# Patient Record
Sex: FEMALE | Race: BLACK OR AFRICAN AMERICAN | NOT HISPANIC OR LATINO | Employment: UNEMPLOYED | URBAN - NONMETROPOLITAN AREA
[De-identification: names, ages, dates, MRNs, and addresses within clinical notes are randomized per-mention and may not be internally consistent; named-entity substitution may affect disease eponyms.]

---

## 2018-02-06 ENCOUNTER — OFFICE VISIT (OUTPATIENT)
Dept: SURGERY | Facility: CLINIC | Age: 63
End: 2018-02-06

## 2018-02-06 VITALS
HEIGHT: 64 IN | BODY MASS INDEX: 42 KG/M2 | WEIGHT: 246 LBS | HEART RATE: 68 BPM | DIASTOLIC BLOOD PRESSURE: 82 MMHG | SYSTOLIC BLOOD PRESSURE: 165 MMHG | TEMPERATURE: 98.2 F

## 2018-02-06 DIAGNOSIS — Z12.11 ENCOUNTER FOR SCREENING COLONOSCOPY: Primary | ICD-10-CM

## 2018-02-06 PROCEDURE — S0285 CNSLT BEFORE SCREEN COLONOSC: HCPCS | Performed by: SURGERY

## 2018-02-06 RX ORDER — CLARITHROMYCIN 500 MG/1
TABLET, COATED ORAL
Refills: 0 | COMMUNITY
Start: 2017-12-05

## 2018-02-06 RX ORDER — ALBUTEROL SULFATE 90 UG/1
AEROSOL, METERED RESPIRATORY (INHALATION)
Refills: 3 | COMMUNITY
Start: 2018-01-19

## 2018-02-06 RX ORDER — ATENOLOL 25 MG/1
25 TABLET ORAL DAILY
COMMUNITY

## 2018-02-06 RX ORDER — HYDROCHLOROTHIAZIDE 50 MG/1
TABLET ORAL
Refills: 3 | COMMUNITY
Start: 2018-01-19

## 2018-02-06 RX ORDER — LOSARTAN POTASSIUM 50 MG/1
TABLET ORAL
Refills: 3 | COMMUNITY
Start: 2018-01-19

## 2018-02-06 RX ORDER — POTASSIUM CHLORIDE 750 MG/1
TABLET, EXTENDED RELEASE ORAL
Refills: 3 | COMMUNITY
Start: 2018-01-19

## 2018-02-06 NOTE — PROGRESS NOTES
2/6/2018    Patient Information  Jennifer Malin  [address] [facility] [address]  1955  [phone] (home)     Chief Complaint   Patient presents with   • Screening Colonoscopy     Dilcia Soares Referral       HPI  Patient is a 62-year-old black female referred for screening colonoscopy.  She has no GI symptomatology.  No family history of colon cancer.    Review of Systems:  The Past medical history, family history, social history, medication list, allergies, and Review of Systems has been reviewed and confirmed.    General: negative  Integumentary: negative  Eyes: negative  ENT: negative  Respiratory: negative  Gastrointestinal: negative  Cardiovascular: negative  Neurological: negative  Psychiatric: negative  Hematologic/Lymphatic: negative  Genitourinary: negative  Musculoskeletal: negative  Endocrine: negative  Breasts: negative      There is no problem list on file for this patient.        Past Medical History:   Diagnosis Date   • Hypertension          History reviewed. No pertinent surgical history.      Family History   Problem Relation Age of Onset   • Hypertension Mother          Social History   Substance Use Topics   • Smoking status: Never Smoker   • Smokeless tobacco: None   • Alcohol use No       Current Outpatient Prescriptions   Medication Sig Dispense Refill   • atenolol (TENORMIN) 25 MG tablet Take 25 mg by mouth Daily.     • calcium carbonate-vitamin d 600-400 MG-UNIT per tablet   3   • Cholecalciferol (VITAMIN D) 1000 units tablet   3   • clarithromycin (BIAXIN) 500 MG tablet   0   • hydrochlorothiazide (HYDRODIURIL) 50 MG tablet   3   • losartan (COZAAR) 50 MG tablet   3   • potassium chloride (K-DUR,KLOR-CON) 10 MEQ CR tablet   3   • VENTOLIN  (90 Base) MCG/ACT inhaler   3   • verapamil SR (CALAN-SR) 180 MG CR tablet   3     No current facility-administered medications for this visit.          Allergies  Review of patient's allergies indicates no known allergies.    BP  "165/82  Pulse 68  Temp 98.2 °F (36.8 °C)  Ht 162.6 cm (64\")  Wt 112 kg (246 lb)  BMI 42.23 kg/m2     Physical Exam   Constitutional: She is oriented to person, place, and time. She appears well-developed and well-nourished. No distress.   HENT:   Head: Normocephalic.   Right Ear: External ear normal.   Left Ear: External ear normal.   Nose: Nose normal.   Mouth/Throat: Oropharynx is clear and moist.   Eyes: Conjunctivae and EOM are normal. Right eye exhibits no discharge. Left eye exhibits no discharge.   Neck: Normal range of motion. No JVD present. No tracheal deviation present. No thyromegaly present.   Cardiovascular: Normal rate, regular rhythm, normal heart sounds and intact distal pulses.  Exam reveals no gallop and no friction rub.    No murmur heard.  Pulmonary/Chest: Effort normal and breath sounds normal. No stridor. No respiratory distress. She has no wheezes. She has no rales. She exhibits no tenderness.   Abdominal: Soft. Bowel sounds are normal. She exhibits no distension and no mass. There is no tenderness. There is no rebound and no guarding. No hernia.   Genitourinary: Rectal exam shows guaiac negative stool.   Musculoskeletal: Normal range of motion. She exhibits no edema, tenderness or deformity.   Lymphadenopathy:     She has no cervical adenopathy.   Neurological: She is alert and oriented to person, place, and time. She has normal reflexes. She displays normal reflexes. No cranial nerve deficit. She exhibits normal muscle tone. Coordination normal.   Skin: Skin is warm and dry. No rash noted. She is not diaphoretic. No erythema. No pallor.   Psychiatric: She has a normal mood and affect. Her behavior is normal. Judgment and thought content normal.   Nursing note and vitals reviewed.                ASSESSMENT  Needs screening colonoscopy        PLAN    Screening colonoscopy          This document signed by Carlos Alberto Mendez MD February 6, 2018 11:08 AM   "

## 2018-02-07 PROBLEM — Z12.11 ENCOUNTER FOR SCREENING COLONOSCOPY: Status: ACTIVE | Noted: 2018-02-07

## 2018-02-14 ENCOUNTER — ANESTHESIA EVENT (OUTPATIENT)
Dept: PERIOP | Facility: HOSPITAL | Age: 63
End: 2018-02-14

## 2018-02-14 ENCOUNTER — HOSPITAL ENCOUNTER (OUTPATIENT)
Facility: HOSPITAL | Age: 63
Setting detail: HOSPITAL OUTPATIENT SURGERY
Discharge: HOME | End: 2018-02-14
Attending: SURGERY | Admitting: SURGERY

## 2018-02-14 ENCOUNTER — ANESTHESIA (OUTPATIENT)
Dept: PERIOP | Facility: HOSPITAL | Age: 63
End: 2018-02-14

## 2018-02-14 VITALS
HEIGHT: 64 IN | RESPIRATION RATE: 18 BRPM | SYSTOLIC BLOOD PRESSURE: 105 MMHG | TEMPERATURE: 97.6 F | HEART RATE: 88 BPM | OXYGEN SATURATION: 95 % | DIASTOLIC BLOOD PRESSURE: 85 MMHG | BODY MASS INDEX: 38.24 KG/M2 | WEIGHT: 224 LBS

## 2018-02-14 DIAGNOSIS — Z12.11 ENCOUNTER FOR SCREENING COLONOSCOPY: ICD-10-CM

## 2018-02-14 PROCEDURE — 45380 COLONOSCOPY AND BIOPSY: CPT | Performed by: SURGERY

## 2018-02-14 PROCEDURE — 25010000002 PROPOFOL 10 MG/ML EMULSION: Performed by: NURSE ANESTHETIST, CERTIFIED REGISTERED

## 2018-02-14 PROCEDURE — 25010000002 MIDAZOLAM PER 1 MG: Performed by: NURSE ANESTHETIST, CERTIFIED REGISTERED

## 2018-02-14 RX ORDER — FENTANYL CITRATE 50 UG/ML
INJECTION, SOLUTION INTRAMUSCULAR; INTRAVENOUS AS NEEDED
Status: DISCONTINUED | OUTPATIENT
Start: 2018-02-14 | End: 2018-02-14

## 2018-02-14 RX ORDER — ONDANSETRON 2 MG/ML
4 INJECTION INTRAMUSCULAR; INTRAVENOUS ONCE AS NEEDED
Status: DISCONTINUED | OUTPATIENT
Start: 2018-02-14 | End: 2018-02-14 | Stop reason: HOSPADM

## 2018-02-14 RX ORDER — SODIUM CHLORIDE 0.9 % (FLUSH) 0.9 %
1-10 SYRINGE (ML) INJECTION AS NEEDED
Status: DISCONTINUED | OUTPATIENT
Start: 2018-02-14 | End: 2018-02-14 | Stop reason: HOSPADM

## 2018-02-14 RX ORDER — OXYCODONE HYDROCHLORIDE AND ACETAMINOPHEN 5; 325 MG/1; MG/1
1 TABLET ORAL ONCE AS NEEDED
Status: DISCONTINUED | OUTPATIENT
Start: 2018-02-14 | End: 2018-02-14 | Stop reason: HOSPADM

## 2018-02-14 RX ORDER — SODIUM CHLORIDE, SODIUM LACTATE, POTASSIUM CHLORIDE, CALCIUM CHLORIDE 600; 310; 30; 20 MG/100ML; MG/100ML; MG/100ML; MG/100ML
125 INJECTION, SOLUTION INTRAVENOUS CONTINUOUS
Status: DISCONTINUED | OUTPATIENT
Start: 2018-02-14 | End: 2018-02-14 | Stop reason: HOSPADM

## 2018-02-14 RX ORDER — IPRATROPIUM BROMIDE AND ALBUTEROL SULFATE 2.5; .5 MG/3ML; MG/3ML
3 SOLUTION RESPIRATORY (INHALATION) ONCE AS NEEDED
Status: DISCONTINUED | OUTPATIENT
Start: 2018-02-14 | End: 2018-02-14 | Stop reason: HOSPADM

## 2018-02-14 RX ORDER — MEPERIDINE HYDROCHLORIDE 50 MG/ML
12.5 INJECTION INTRAMUSCULAR; INTRAVENOUS; SUBCUTANEOUS
Status: DISCONTINUED | OUTPATIENT
Start: 2018-02-14 | End: 2018-02-14 | Stop reason: HOSPADM

## 2018-02-14 RX ORDER — FENTANYL CITRATE 50 UG/ML
50 INJECTION, SOLUTION INTRAMUSCULAR; INTRAVENOUS
Status: DISCONTINUED | OUTPATIENT
Start: 2018-02-14 | End: 2018-02-14 | Stop reason: HOSPADM

## 2018-02-14 RX ORDER — LIDOCAINE HYDROCHLORIDE 20 MG/ML
INJECTION, SOLUTION EPIDURAL; INFILTRATION; INTRACAUDAL; PERINEURAL AS NEEDED
Status: DISCONTINUED | OUTPATIENT
Start: 2018-02-14 | End: 2018-02-14 | Stop reason: SURG

## 2018-02-14 RX ORDER — GLYCOPYRROLATE 0.2 MG/ML
INJECTION INTRAMUSCULAR; INTRAVENOUS AS NEEDED
Status: DISCONTINUED | OUTPATIENT
Start: 2018-02-14 | End: 2018-02-14 | Stop reason: SURG

## 2018-02-14 RX ORDER — PROPOFOL 10 MG/ML
VIAL (ML) INTRAVENOUS AS NEEDED
Status: DISCONTINUED | OUTPATIENT
Start: 2018-02-14 | End: 2018-02-14 | Stop reason: SURG

## 2018-02-14 RX ORDER — MIDAZOLAM HYDROCHLORIDE 1 MG/ML
INJECTION INTRAMUSCULAR; INTRAVENOUS AS NEEDED
Status: DISCONTINUED | OUTPATIENT
Start: 2018-02-14 | End: 2018-02-14 | Stop reason: SURG

## 2018-02-14 RX ADMIN — LIDOCAINE HYDROCHLORIDE 100 MG: 20 INJECTION, SOLUTION EPIDURAL; INFILTRATION; INTRACAUDAL; PERINEURAL at 12:06

## 2018-02-14 RX ADMIN — MIDAZOLAM HYDROCHLORIDE 2 MG: 1 INJECTION, SOLUTION INTRAMUSCULAR; INTRAVENOUS at 12:01

## 2018-02-14 RX ADMIN — PROPOFOL 50 MG: 10 INJECTION, EMULSION INTRAVENOUS at 12:10

## 2018-02-14 RX ADMIN — GLYCOPYRROLATE 0.2 MG: 0.2 INJECTION, SOLUTION INTRAMUSCULAR; INTRAVENOUS at 12:17

## 2018-02-14 RX ADMIN — PROPOFOL 50 MG: 10 INJECTION, EMULSION INTRAVENOUS at 12:06

## 2018-02-14 RX ADMIN — SODIUM CHLORIDE, POTASSIUM CHLORIDE, SODIUM LACTATE AND CALCIUM CHLORIDE 125 ML/HR: 600; 310; 30; 20 INJECTION, SOLUTION INTRAVENOUS at 09:39

## 2018-02-14 NOTE — PLAN OF CARE
Problem: GI Endoscopy (Adult)  Goal: Signs and Symptoms of Listed Potential Problems Will be Absent or Manageable (GI Endoscopy)  Outcome: Ongoing (interventions implemented as appropriate)   02/14/18 7372   GI Endoscopy   Problems Assessed (GI Endoscopy) all   Problems Present (GI Endoscopy) none

## 2018-02-14 NOTE — ANESTHESIA POSTPROCEDURE EVALUATION
Patient: Jennifer Malin    Procedure Summary     Date Anesthesia Start Anesthesia Stop Room / Location    02/14/18 1201 1224 [facility] 08 / [facility]       Procedure Diagnosis Surgeon Provider    COLONOSCOPY (N/A ) Encounter for screening colonoscopy  (Encounter for screening colonoscopy [Z12.11]) MD Lexx Juarez MD          Anesthesia Type: general  Last vitals  BP   (P) 121/60 (02/14/18 1226)   Temp   (P) 97.6 °F (36.4 °C) (02/14/18 1226)   Pulse   (P) 89 (02/14/18 1226)   Resp   (P) 20 (02/14/18 1226)     SpO2   (P) 91 % (02/14/18 1226)     Post Anesthesia Care and Evaluation    Patient location during evaluation: bedside  Patient participation: complete - patient participated  Level of consciousness: awake and alert  Pain score: 1  Pain management: adequate  Airway patency: patent  Anesthetic complications: No anesthetic complications  PONV Status: none  Cardiovascular status: acceptable  Respiratory status: acceptable  Hydration status: acceptable

## 2018-02-14 NOTE — ANESTHESIA PREPROCEDURE EVALUATION
Anesthesia Evaluation     Patient summary reviewed and Nursing notes reviewed   no history of anesthetic complications:  NPO Solid Status: > 8 hours  NPO Liquid Status: > 8 hours           Airway   Mallampati: II  TM distance: >3 FB  Neck ROM: full  no difficulty expected  Dental - normal exam     Pulmonary - normal exam   (+) a smoker Former, sleep apnea,   (-) asthma  Cardiovascular - normal exam  Exercise tolerance: good (4-7 METS)    NYHA Classification: II  Patient on routine beta blocker and Beta blocker given within 24 hours of surgery    (+) hypertension,   (-) past MI, dysrhythmias, angina, CHF, hyperlipidemia      Neuro/Psych- negative ROS  (-) seizures, CVA  GI/Hepatic/Renal/Endo    (+) morbid obesity, GERD,   (-) liver disease, no renal disease, diabetes, hypothyroidism    Musculoskeletal     (+) back pain,   (-) neck pain  Abdominal  - normal exam    Bowel sounds: normal.   Substance History - negative use     OB/GYN negative ob/gyn ROS         Other - negative ROS       (-) arthritis                  Anesthesia Plan    ASA 3     general     intravenous induction   Anesthetic plan and risks discussed with patient.  Use of blood products discussed with patient  Consented to blood products.

## 2018-02-14 NOTE — PLAN OF CARE
Problem: GI Endoscopy (Adult)  Goal: Signs and Symptoms of Listed Potential Problems Will be Absent or Manageable (GI Endoscopy)  Outcome: Ongoing (interventions implemented as appropriate)   02/14/18 1207   GI Endoscopy   Problems Assessed (GI Endoscopy) all   Problems Present (GI Endoscopy) none

## 2018-02-14 NOTE — OP NOTE
Jennifer Malin  2/14/2018      Operative Progress Note:    Surgeon and Assistant: Dr. Mendez    Pre-Operative Diagnosis: screening colonoscopy    Post-Operative Diagnosis: normal colonoscopy    Procedure(s):  colonoscopy to cecum    Type of Anesthesia Administered: monitored anesthesia care    Estimated Blood Loss: none    Blood Products: None    Specimen Obtained/Removed:None    Complication(s):  None    Graft/Implant/Prosthetics/Implanted Device/Transplants: None    Indication: patient is a 62-year-old white female    Findings: colon normal in its entirety    Operative Report:  Patient was taken to the operating room and placed in a left lateral decubitus position.  IV sedation was given.  Colonoscope was introduced and passed all the way to cecum.  The scope was slowly withdrawn.  Cecum, ascending colon, hepatic flexure, transverse colon, splenic flexure, descending colon, sigmoid colon, and rectum were all normal.  Digital rectal exam was unremarkable.  The procedures and terminated.  Patient tolerated procedure very well and was returned recovery room in satisfactory condition    Patient will need a repeat colonoscopy in 10 years or less.    Patient will be seen back in the office on a when necessary basis.       Electronically Signed by: Carlos Alberto Mendez MD        Dictated Utilizing Dragon Dictation

## 2018-02-14 NOTE — PLAN OF CARE
Problem: Patient Care Overview (Adult)  Goal: Discharge Needs Assessment  Outcome: Ongoing (interventions implemented as appropriate)   02/14/18 6699   Discharge Needs Assessment   Concerns To Be Addressed no discharge needs identified

## 2018-02-14 NOTE — H&P (VIEW-ONLY)
2/6/2018    Patient Information  Jennifer Malin  [address] [facility] [address]  1955  [phone] (home)     Chief Complaint   Patient presents with   • Screening Colonoscopy     Dilcia Soares Referral       HPI  Patient is a 62-year-old black female referred for screening colonoscopy.  She has no GI symptomatology.  No family history of colon cancer.    Review of Systems:  The Past medical history, family history, social history, medication list, allergies, and Review of Systems has been reviewed and confirmed.    General: negative  Integumentary: negative  Eyes: negative  ENT: negative  Respiratory: negative  Gastrointestinal: negative  Cardiovascular: negative  Neurological: negative  Psychiatric: negative  Hematologic/Lymphatic: negative  Genitourinary: negative  Musculoskeletal: negative  Endocrine: negative  Breasts: negative      There is no problem list on file for this patient.        Past Medical History:   Diagnosis Date   • Hypertension          History reviewed. No pertinent surgical history.      Family History   Problem Relation Age of Onset   • Hypertension Mother          Social History   Substance Use Topics   • Smoking status: Never Smoker   • Smokeless tobacco: None   • Alcohol use No       Current Outpatient Prescriptions   Medication Sig Dispense Refill   • atenolol (TENORMIN) 25 MG tablet Take 25 mg by mouth Daily.     • calcium carbonate-vitamin d 600-400 MG-UNIT per tablet   3   • Cholecalciferol (VITAMIN D) 1000 units tablet   3   • clarithromycin (BIAXIN) 500 MG tablet   0   • hydrochlorothiazide (HYDRODIURIL) 50 MG tablet   3   • losartan (COZAAR) 50 MG tablet   3   • potassium chloride (K-DUR,KLOR-CON) 10 MEQ CR tablet   3   • VENTOLIN  (90 Base) MCG/ACT inhaler   3   • verapamil SR (CALAN-SR) 180 MG CR tablet   3     No current facility-administered medications for this visit.          Allergies  Review of patient's allergies indicates no known allergies.    BP  "165/82  Pulse 68  Temp 98.2 °F (36.8 °C)  Ht 162.6 cm (64\")  Wt 112 kg (246 lb)  BMI 42.23 kg/m2     Physical Exam   Constitutional: She is oriented to person, place, and time. She appears well-developed and well-nourished. No distress.   HENT:   Head: Normocephalic.   Right Ear: External ear normal.   Left Ear: External ear normal.   Nose: Nose normal.   Mouth/Throat: Oropharynx is clear and moist.   Eyes: Conjunctivae and EOM are normal. Right eye exhibits no discharge. Left eye exhibits no discharge.   Neck: Normal range of motion. No JVD present. No tracheal deviation present. No thyromegaly present.   Cardiovascular: Normal rate, regular rhythm, normal heart sounds and intact distal pulses.  Exam reveals no gallop and no friction rub.    No murmur heard.  Pulmonary/Chest: Effort normal and breath sounds normal. No stridor. No respiratory distress. She has no wheezes. She has no rales. She exhibits no tenderness.   Abdominal: Soft. Bowel sounds are normal. She exhibits no distension and no mass. There is no tenderness. There is no rebound and no guarding. No hernia.   Genitourinary: Rectal exam shows guaiac negative stool.   Musculoskeletal: Normal range of motion. She exhibits no edema, tenderness or deformity.   Lymphadenopathy:     She has no cervical adenopathy.   Neurological: She is alert and oriented to person, place, and time. She has normal reflexes. She displays normal reflexes. No cranial nerve deficit. She exhibits normal muscle tone. Coordination normal.   Skin: Skin is warm and dry. No rash noted. She is not diaphoretic. No erythema. No pallor.   Psychiatric: She has a normal mood and affect. Her behavior is normal. Judgment and thought content normal.   Nursing note and vitals reviewed.                ASSESSMENT  Needs screening colonoscopy        PLAN    Screening colonoscopy          This document signed by Carlos Alberto Mendez MD February 6, 2018 11:08 AM   "

## 2018-02-14 NOTE — PLAN OF CARE
Problem: Patient Care Overview (Adult)  Goal: Plan of Care Review  Outcome: Ongoing (interventions implemented as appropriate)   02/14/18 0814   Coping/Psychosocial Response Interventions   Plan Of Care Reviewed With patient   Patient Care Overview   Progress progress toward functional goals as expected

## 2018-02-16 LAB
LAB AP CASE REPORT: NORMAL
Lab: NORMAL
PATH REPORT.FINAL DX SPEC: NORMAL

## 2018-02-21 NOTE — OP NOTE
Carlos Alberto Mendez MD   Surgery      []Hide copied text  Jacquelyn Farmer  2/14/2018        Operative Progress Note:     Surgeon and Assistant: Dr. Mendez     Pre-Operative Diagnosis: screening colonoscopy     Post-Operative Diagnosis: cecal polyp 0.25 cm and internal hemorrhoids     Procedure(s):  colonoscopy and polypectomy with cold forceps     Type of Anesthesia Administered: monitored anesthesia care     Estimated Blood Loss: Minimal     Blood Products: None     Specimen Obtained/Removed: cecal polyp     Complication(s):  None     Graft/Implant/Prosthetics/Implanted Device/Transplants: None     Indication: patient is a 67-year-old white female     Findings: colon normal in its entirety except for a small 2.5 mm cecal polyp which was removed with cold forceps.  Internal hemorrhoids     Operative Report:  Patient was taken to the operating room and placed in a left lateral decubitus position.  IV sedation was given.  Colonoscope was introduced and passed all the way to cecum.  The scope was slowly withdrawn.   ascending colon, hepatic flexure, transverse colon, splenic flexure, descending colon, sigmoid colon, and rectum were all normal.  Cecum there was a 0.25 cm polyp.  Internal hemorrhoids were also found Digital rectal exam was unremarkable.  The procedures and terminated.  Patient tolerated procedure very well and was returned recovery room in satisfactory condition .     He'll be discharged home at recovery and will be seen back in the office in one week.           Patient will need a repeat colonoscopy in 5 years or less.                      Electronically Signed by: Carlos Alberto Mendez MD          Dictated Utilizing Dragon Dictation              Admission (Discharged) on 2/14/2018              Detailed Report

## 2018-02-22 ENCOUNTER — OFFICE VISIT (OUTPATIENT)
Dept: SURGERY | Facility: CLINIC | Age: 63
End: 2018-02-22

## 2018-02-22 VITALS
HEART RATE: 71 BPM | SYSTOLIC BLOOD PRESSURE: 168 MMHG | WEIGHT: 245 LBS | BODY MASS INDEX: 41.83 KG/M2 | RESPIRATION RATE: 18 BRPM | TEMPERATURE: 98 F | DIASTOLIC BLOOD PRESSURE: 76 MMHG | HEIGHT: 64 IN

## 2018-02-22 DIAGNOSIS — D12.2 ADENOMATOUS POLYP OF ASCENDING COLON: Primary | ICD-10-CM

## 2018-02-22 PROCEDURE — 99213 OFFICE O/P EST LOW 20 MIN: CPT | Performed by: SURGERY

## 2018-02-22 NOTE — PROGRESS NOTES
2/22/2018    Patient Information  Jennifer Malin  [address] [facility] [address]  1955  [phone] (home)     Chief Complaint   Patient presents with   • Follow-up     FU Colonoscopy       HPI  Patient is a 62-year-old black female here for follow-up on screening colonoscopy.  She had a small polyp in her cecum.  Histology shows it to be an adenomatous polyp.  She had a brother who had colon cancer    Review of Systems:  The Past medical history, family history, social history, medication list, allergies, and Review of Systems has been reviewed and confirmed.    General: negative  Integumentary: negative  Eyes: negative  ENT: negative  Respiratory: negative  Gastrointestinal: negative  Cardiovascular: negative  Neurological: negative  Psychiatric: negative  Hematologic/Lymphatic: negative  Genitourinary: negative  Musculoskeletal: negative  Endocrine: negative  Breasts: negative      Patient Active Problem List   Diagnosis   • Encounter for screening colonoscopy         Past Medical History:   Diagnosis Date   • Hypertension          Past Surgical History:   Procedure Laterality Date   • COLONOSCOPY N/A 2/14/2018    Procedure: COLONOSCOPY;  Surgeon: Carlos Alberto Mendez MD;  Location: [facility];  Service:          Family History   Problem Relation Age of Onset   • Hypertension Mother          Social History   Substance Use Topics   • Smoking status: Former Smoker     Quit date: 2009   • Smokeless tobacco: Never Used   • Alcohol use No      Comment: quit in 2009       Current Outpatient Prescriptions   Medication Sig Dispense Refill   • atenolol (TENORMIN) 25 MG tablet Take 25 mg by mouth Daily.     • calcium carbonate-vitamin d 600-400 MG-UNIT per tablet   3   • Cholecalciferol (VITAMIN D) 1000 units tablet   3   • clarithromycin (BIAXIN) 500 MG tablet   0   • hydrochlorothiazide (HYDRODIURIL) 50 MG tablet   3   • losartan (COZAAR) 50 MG tablet   3   • potassium chloride (K-DUR,KLOR-CON) 10 MEQ CR tablet  "  3   • VENTOLIN  (90 Base) MCG/ACT inhaler   3   • verapamil SR (CALAN-SR) 180 MG CR tablet   3     No current facility-administered medications for this visit.          Allergies  Review of patient's allergies indicates no known allergies.    /76  Pulse 71  Temp 98 °F (36.7 °C)  Resp 18  Ht 162.6 cm (64\")  Wt 111 kg (245 lb)  BMI 42.05 kg/m2     Physical Exam  Gen. 62-year-old black female no distress  Abdomen soft      ASSESSMENT  Adenomatous polyp in cecum with significant family history of colon cancer    PLAN    Repeat colonoscopy 3 years          This document signed by Carlos Alberto Mendez MD February 22, 2018 11:17 AM   "

## (undated) DEVICE — Device

## (undated) DEVICE — SYR LUERLOK 30CC

## (undated) DEVICE — Device: Brand: ENDOGATOR

## (undated) DEVICE — Device: Brand: DEFENDO AIR/WATER/SUCTION AND BIOPSY VALVE

## (undated) DEVICE — ENDOCUFF VISION PRP SM 10.4

## (undated) DEVICE — GOWN,REINF,POLY,ECL,PP SLV,XL: Brand: MEDLINE

## (undated) DEVICE — FRCP BX RADJAW4 NDL 2.8 240CM LG OG BX40

## (undated) DEVICE — SUCTION CANISTER, 1500CC, RIGID: Brand: DEROYAL

## (undated) DEVICE — CONN Y IRR DISP 1P/U

## (undated) DEVICE — SINGLE PORT MANIFOLD: Brand: NEPTUNE 2

## (undated) DEVICE — TUBING, SUCTION, 1/4" X 20', STRAIGHT: Brand: MEDLINE INDUSTRIES, INC.